# Patient Record
Sex: MALE | Race: BLACK OR AFRICAN AMERICAN | Employment: FULL TIME | URBAN - METROPOLITAN AREA
[De-identification: names, ages, dates, MRNs, and addresses within clinical notes are randomized per-mention and may not be internally consistent; named-entity substitution may affect disease eponyms.]

---

## 2017-02-08 ENCOUNTER — HOSPITAL ENCOUNTER (EMERGENCY)
Age: 32
Discharge: HOME | End: 2017-02-08
Attending: EMERGENCY MEDICINE
Payer: COMMERCIAL

## 2017-02-08 VITALS
OXYGEN SATURATION: 99 % | TEMPERATURE: 100.4 F | RESPIRATION RATE: 18 BRPM | WEIGHT: 190 LBS | SYSTOLIC BLOOD PRESSURE: 126 MMHG | BODY MASS INDEX: 25.73 KG/M2 | HEIGHT: 72 IN | DIASTOLIC BLOOD PRESSURE: 73 MMHG | HEART RATE: 89 BPM

## 2017-02-08 DIAGNOSIS — Z76.0 MEDICATION REFILL: ICD-10-CM

## 2017-02-08 DIAGNOSIS — J10.1 INFLUENZA A: Primary | ICD-10-CM

## 2017-02-08 LAB
FLUAV AG NPH QL IA: POSITIVE
FLUBV AG NPH QL IA: NEGATIVE
SERVICE CMNT-IMP: ABNORMAL

## 2017-02-08 PROCEDURE — 87804 INFLUENZA ASSAY W/OPTIC: CPT | Performed by: EMERGENCY MEDICINE

## 2017-02-08 PROCEDURE — 74011250637 HC RX REV CODE- 250/637: Performed by: EMERGENCY MEDICINE

## 2017-02-08 PROCEDURE — 99283 EMERGENCY DEPT VISIT LOW MDM: CPT | Performed by: EMERGENCY MEDICINE

## 2017-02-08 RX ORDER — ACETAMINOPHEN 500 MG
1000 TABLET ORAL
Status: COMPLETED | OUTPATIENT
Start: 2017-02-08 | End: 2017-02-08

## 2017-02-08 RX ORDER — OSELTAMIVIR PHOSPHATE 75 MG/1
75 CAPSULE ORAL 2 TIMES DAILY
Qty: 10 CAP | Refills: 0 | Status: SHIPPED | OUTPATIENT
Start: 2017-02-08 | End: 2017-02-13

## 2017-02-08 RX ORDER — ALBUTEROL SULFATE 90 UG/1
2 AEROSOL, METERED RESPIRATORY (INHALATION)
Qty: 1 INHALER | Refills: 0 | Status: SHIPPED | OUTPATIENT
Start: 2017-02-08

## 2017-02-08 RX ADMIN — ACETAMINOPHEN 1000 MG: 500 TABLET ORAL at 21:05

## 2017-02-08 NOTE — LETTER
[facility] EMERGENCY DEPT 
[address] [place] [id number] [phone] Work/School Note Date: 2/8/2017 To Whom It May concern: 
 
Sofiya Traore was seen and treated today in the emergency room by the following provider(s): 
Attending Provider: Henry Alford MD.   
 
Sofiya Traore may return to work on 2/11/2017.  
 
Sincerely, 
 
 
 
 
Elsi Martini RN

## 2017-02-08 NOTE — LETTER
[address] [facility] EMERGENCY DEPT 
[facility] [address] [facility] [address] 
[phone] Work/School Note Date: 2/8/2017 To Whom It May concern: 
 
Shady Mae was seen and treated today in the emergency room by the following provider(s): 
Attending Provider: Lacy Mcnamara MD.   
 
Shady Mae may return to work on 2/12/2017.  
 
Sincerely, 
 
 
 
 
Jake Carter RN

## 2017-02-09 NOTE — ED PROVIDER NOTES
Patient is a 27 y.o. male presenting with flu symptoms. The history is provided by the patient. Flu   This is a new problem. The current episode started yesterday. The problem occurs constantly. The problem has not changed since onset. The cough is non-productive. There has been a fever of 101 - 101.9 F. The fever has been present for less than 1 day. Associated symptoms include rhinorrhea, sore throat and myalgias. Pertinent negatives include no chest pain, no chills, no shortness of breath, no wheezing, no nausea and no vomiting. He has tried nothing for the symptoms. He is a smoker. His past medical history is significant for asthma. Past Medical History:   Diagnosis Date    Asthma        History reviewed. No pertinent past surgical history. History reviewed. No pertinent family history. Social History     Social History    Marital status: SINGLE     Spouse name: N/A    Number of children: N/A    Years of education: N/A     Occupational History    Not on file. Social History Main Topics    Smoking status: Current Every Day Smoker    Smokeless tobacco: Not on file    Alcohol use Yes    Drug use: No    Sexual activity: Not on file     Other Topics Concern    Not on file     Social History Narrative         ALLERGIES: Review of patient's allergies indicates no known allergies. Review of Systems   Constitutional: Positive for fever. Negative for chills. HENT: Positive for congestion, rhinorrhea and sore throat. Respiratory: Positive for cough. Negative for shortness of breath and wheezing. Cardiovascular: Negative for chest pain and leg swelling. Gastrointestinal: Negative for diarrhea, nausea and vomiting. Genitourinary: Negative for dysuria, frequency and hematuria. Musculoskeletal: Positive for myalgias. Negative for arthralgias.        Vitals:    02/08/17 2027   BP: 126/73   Pulse: 84   Resp: 18   Temp: (!) 101.5 °F (38.6 °C)   SpO2: 99%   Weight: 86.2 kg (190 lb) Height: 5' 11.5\" (1.816 m)            Physical Exam   Constitutional: He appears well-developed and well-nourished. HENT:   Right Ear: External ear normal.   Left Ear: External ear normal.   Oropharynx injected, uvula midline   Neck: Normal range of motion. Neck supple. Cardiovascular: Normal rate, regular rhythm and normal heart sounds. Pulmonary/Chest: Effort normal and breath sounds normal. No respiratory distress. He has no wheezes. He has no rales. Musculoskeletal: Normal range of motion. He exhibits no edema or tenderness. Lymphadenopathy:     He has no cervical adenopathy. Nursing note and vitals reviewed. MDM  Number of Diagnoses or Management Options  Diagnosis management comments: Inlfuenza A. No pneumonia clinically. No distress. Out of inhaler but no wheezing.        Amount and/or Complexity of Data Reviewed  Clinical lab tests: ordered and reviewed (Results for orders placed or performed during the hospital encounter of 02/08/17  -INFLUENZA A & B AG (RAPID TEST)       Result                                            Value                         Ref Range                       Influenza A Ag                                    POSITIVE (A)                  NEG                             Influenza B Ag                                    NEGATIVE                      NEG                             Comment                                           HIDE                                                     )      ED Course       Procedures

## 2017-02-09 NOTE — DISCHARGE INSTRUCTIONS
Influenza (Flu): Care Instructions  Your Care Instructions  Influenza (flu) is an infection in the lungs and breathing passages. It is caused by the influenza virus. There are different strains, or types, of the flu virus from year to year. Unlike the common cold, the flu comes on suddenly and the symptoms, such as a cough, congestion, fever, chills, fatigue, aches, and pains, are more severe. These symptoms may last up to 10 days. Although the flu can make you feel very sick, it usually doesn't cause serious health problems. Home treatment is usually all you need for flu symptoms. But your doctor may prescribe antiviral medicine to prevent other health problems, such as pneumonia, from developing. Older people and those who have a long-term health condition, such as lung disease, are most at risk for having pneumonia or other health problems. Follow-up care is a key part of your treatment and safety. Be sure to make and go to all appointments, and call your doctor if you are having problems. Its also a good idea to know your test results and keep a list of the medicines you take. How can you care for yourself at home? · Get plenty of rest.  · Drink plenty of fluids, enough so that your urine is light yellow or clear like water. If you have kidney, heart, or liver disease and have to limit fluids, talk with your doctor before you increase the amount of fluids you drink. · Take an over-the-counter pain medicine if needed, such as acetaminophen (Tylenol), ibuprofen (Advil, Motrin), or naproxen (Aleve), to relieve fever, headache, and muscle aches. Read and follow all instructions on the label. No one younger than 20 should take aspirin. It has been linked to Reye syndrome, a serious illness. · Do not smoke. Smoking can make the flu worse. If you need help quitting, talk to your doctor about stop-smoking programs and medicines. These can increase your chances of quitting for good.   · Breathe moist air from a hot shower or from a sink filled with hot water to help clear a stuffy nose. · Before you use cough and cold medicines, check the label. These medicines may not be safe for young children or for people with certain health problems. · If the skin around your nose and lips becomes sore, put some petroleum jelly on the area. · To ease coughing:  ¨ Drink fluids to soothe a scratchy throat. ¨ Suck on cough drops or plain hard candy. ¨ Take an over-the-counter cough medicine that contains dextromethorphan to help you get some sleep. Read and follow all instructions on the label. ¨ Raise your head at night with an extra pillow. This may help you rest if coughing keeps you awake. · Take any prescribed medicine exactly as directed. Call your doctor if you think you are having a problem with your medicine. To avoid spreading the flu  · Wash your hands regularly, and keep your hands away from your face. · Stay home from school, work, and other public places until you are feeling better and your fever has been gone for at least 24 hours. The fever needs to have gone away on its own without the help of medicine. · Ask people living with you to talk to their doctors about preventing the flu. They may get antiviral medicine to keep from getting the flu from you. · To prevent the flu in the future, get a flu vaccine every fall. Encourage people living with you to get the vaccine. · Cover your mouth when you cough or sneeze. When should you call for help? Call 911 anytime you think you may need emergency care. For example, call if:  · You have severe trouble breathing. Call your doctor now or seek immediate medical care if:  · You have new or worse trouble breathing. · You seem to be getting much sicker. · You feel very sleepy or confused. · You have a new or higher fever. · You get a new rash.   Watch closely for changes in your health, and be sure to contact your doctor if:  · You begin to get better and then get worse. · You are not getting better after 1 week. Where can you learn more? Go to http://siddhartha-geneva.info/. Enter Y757 in the search box to learn more about \"Influenza (Flu): Care Instructions. \"  Current as of: May 23, 2016  Content Version: 11.1  © 6496-7589 [employer]. Care instructions adapted under license by [employer] (which disclaims liability or warranty for this information). If you have questions about a medical condition or this instruction, always ask your healthcare professional. [address] any warranty or liability for your use of this information.

## 2022-08-11 ENCOUNTER — HOSPITAL ENCOUNTER (EMERGENCY)
Age: 37
Discharge: HOME | End: 2022-08-11
Attending: EMERGENCY MEDICINE | Admitting: EMERGENCY MEDICINE
Payer: COMMERCIAL

## 2022-08-11 VITALS
BODY MASS INDEX: 26.82 KG/M2 | DIASTOLIC BLOOD PRESSURE: 72 MMHG | RESPIRATION RATE: 16 BRPM | WEIGHT: 198 LBS | HEART RATE: 65 BPM | OXYGEN SATURATION: 99 % | SYSTOLIC BLOOD PRESSURE: 133 MMHG | TEMPERATURE: 98.2 F | HEIGHT: 72 IN

## 2022-08-11 DIAGNOSIS — L02.91 ABSCESS: Primary | ICD-10-CM

## 2022-08-11 PROCEDURE — 69000 DRG XTRNL EAR ABSC/HEM SMPL: CPT

## 2022-08-11 PROCEDURE — 99283 EMERGENCY DEPT VISIT LOW MDM: CPT

## 2022-08-11 PROCEDURE — 87070 CULTURE OTHR SPECIMN AEROBIC: CPT

## 2022-08-11 PROCEDURE — 6370000000 HC RX 637 (ALT 250 FOR IP): Performed by: PHYSICIAN ASSISTANT

## 2022-08-11 RX ORDER — IBUPROFEN 600 MG/1
600 TABLET ORAL
Status: COMPLETED | OUTPATIENT
Start: 2022-08-11 | End: 2022-08-11

## 2022-08-11 RX ADMIN — IBUPROFEN 600 MG: 600 TABLET ORAL at 16:49

## 2022-08-11 ASSESSMENT — PAIN DESCRIPTION - PAIN TYPE: TYPE: ACUTE PAIN

## 2022-08-11 ASSESSMENT — ENCOUNTER SYMPTOMS
NAUSEA: 0
VOMITING: 0
GASTROINTESTINAL NEGATIVE: 1
EYES NEGATIVE: 1
ALLERGIC/IMMUNOLOGIC NEGATIVE: 1
RESPIRATORY NEGATIVE: 1
COLOR CHANGE: 1

## 2022-08-11 ASSESSMENT — PAIN - FUNCTIONAL ASSESSMENT: PAIN_FUNCTIONAL_ASSESSMENT: 0-10

## 2022-08-11 ASSESSMENT — PAIN SCALES - GENERAL
PAINLEVEL_OUTOF10: 7
PAINLEVEL_OUTOF10: 7

## 2022-08-11 ASSESSMENT — PAIN DESCRIPTION - ORIENTATION
ORIENTATION: LEFT
ORIENTATION: LEFT

## 2022-08-11 ASSESSMENT — PAIN DESCRIPTION - LOCATION
LOCATION: FACE
LOCATION: FACE

## 2022-08-11 ASSESSMENT — PAIN DESCRIPTION - DESCRIPTORS
DESCRIPTORS: ACHING;THROBBING
DESCRIPTORS: TIGHTNESS

## 2022-08-11 ASSESSMENT — PAIN DESCRIPTION - FREQUENCY: FREQUENCY: CONTINUOUS

## 2022-08-11 NOTE — ED TRIAGE NOTES
Patient reports x months of raised red bump to left face. Patient states he has had increased swelling and pain x several days and was seen at urgent care x 2 days ago for same.  Masked

## 2022-08-11 NOTE — ED NOTES
I have reviewed discharge instructions with the patient. The patient verbalized understanding. Patient left ED via private vehicle. Discharge Method: ambulatory to Home with mother. Opportunity for questions and clarification provided. Patient given 0 scripts. To continue your aftercare when you leave the hospital, you may receive an automated call from our care team to check in on how you are doing. This is a free service and part of our promise to provide the best care and service to meet your aftercare needs.  If you have questions, or wish to unsubscribe from this service please call [phone]. Thank you for Choosing our [facility] Emergency Department.        Selena Singh RN  08/11/22 9613

## 2022-08-14 LAB
BACTERIA SPEC CULT: NORMAL
GRAM STN SPEC: NORMAL
GRAM STN SPEC: NORMAL
SERVICE CMNT-IMP: NORMAL

## 2022-08-15 ENCOUNTER — HOSPITAL ENCOUNTER (EMERGENCY)
Age: 37
Discharge: HOME | End: 2022-08-15
Attending: EMERGENCY MEDICINE
Payer: COMMERCIAL

## 2022-08-15 VITALS
WEIGHT: 198 LBS | HEIGHT: 72 IN | OXYGEN SATURATION: 98 % | DIASTOLIC BLOOD PRESSURE: 80 MMHG | SYSTOLIC BLOOD PRESSURE: 139 MMHG | HEART RATE: 75 BPM | RESPIRATION RATE: 17 BRPM | TEMPERATURE: 98.3 F | BODY MASS INDEX: 26.82 KG/M2

## 2022-08-15 DIAGNOSIS — Z51.89 WOUND CHECK, ABSCESS: Primary | ICD-10-CM

## 2022-08-15 PROCEDURE — 4500000002 HC ER NO CHARGE

## 2022-08-15 PROCEDURE — 6370000000 HC RX 637 (ALT 250 FOR IP): Performed by: EMERGENCY MEDICINE

## 2022-08-15 RX ORDER — IBUPROFEN 800 MG/1
800 TABLET ORAL EVERY 8 HOURS PRN
Qty: 21 TABLET | Refills: 0 | Status: SHIPPED | OUTPATIENT
Start: 2022-08-15

## 2022-08-15 RX ORDER — TRAMADOL HYDROCHLORIDE 50 MG/1
50-100 TABLET ORAL EVERY 8 HOURS PRN
Qty: 12 TABLET | Refills: 0 | Status: SHIPPED | OUTPATIENT
Start: 2022-08-15 | End: 2022-08-18

## 2022-08-15 RX ORDER — HYDROCODONE BITARTRATE AND ACETAMINOPHEN 10; 325 MG/1; MG/1
1 TABLET ORAL
Status: COMPLETED | OUTPATIENT
Start: 2022-08-15 | End: 2022-08-15

## 2022-08-15 RX ADMIN — HYDROCODONE BITARTRATE AND ACETAMINOPHEN 1 TABLET: 10; 325 TABLET ORAL at 16:17

## 2022-08-15 ASSESSMENT — PAIN - FUNCTIONAL ASSESSMENT
PAIN_FUNCTIONAL_ASSESSMENT: ACTIVITIES ARE NOT PREVENTED
PAIN_FUNCTIONAL_ASSESSMENT: 0-10

## 2022-08-15 ASSESSMENT — PAIN SCALES - GENERAL
PAINLEVEL_OUTOF10: 0
PAINLEVEL_OUTOF10: 10

## 2022-08-15 ASSESSMENT — ENCOUNTER SYMPTOMS
FACIAL SWELLING: 1
TROUBLE SWALLOWING: 0

## 2022-08-15 ASSESSMENT — PAIN DESCRIPTION - DESCRIPTORS: DESCRIPTORS: ACHING;DISCOMFORT

## 2022-08-15 ASSESSMENT — PAIN DESCRIPTION - LOCATION: LOCATION: FACE

## 2022-08-15 NOTE — ED PROVIDER NOTES
[facility] Emergency Department Provider Note                   PCP:                None Provider               Age: 39 y.o. Sex: male     No diagnosis found. DISPOSITION         New Prescriptions    No medications on file       No orders of the defined types were placed in this encounter. Noah Ramesh is a 39 y.o. male who presents to the Emergency Department with chief complaint of    Chief Complaint   Patient presents with    Skin Problem      Chief complaint : Wound check    HISTORY OF PRESENT ILLNESS :  Location : Left mandibular angle    Quality : Abscess    Quantity : Only 1    Timing : A week    Severity : Mild to moderate    Context : Status post I&D on August 11    Alleviating / exacerbating factors : Tender to palpation    Associated Symptoms : No fevers    -------------------------------    FAMILY HISTORY : Noncontributory    SURGICAL HISTORY : Recent incision and drainage     MEDICAL HISTORY : No diabetes    SOCIAL HISTORY : Consumes tobacco, consumes alcohol        Review of Systems   Constitutional:  Negative for chills and fever. HENT:  Positive for facial swelling. Negative for drooling and trouble swallowing. Musculoskeletal:  Negative for neck pain and neck stiffness. Skin:  Positive for wound. Negative for rash. All other systems reviewed and are negative. All other systems reviewed and are negative. Past Medical History:   Diagnosis Date    Asthma         History reviewed. No pertinent surgical history. History reviewed. No pertinent family history. Social Connections: Not on file        Allergies   Allergen Reactions    Shellfish Allergy      Seafood allergy        Vitals signs and nursing note reviewed. Patient Vitals for the past 4 hrs:   Temp Pulse Resp BP SpO2   08/15/22 1503 98.3 °F (36.8 °C) 75 17 139/80 98 %          Physical Exam  Vitals and nursing note reviewed. Constitutional:       General: He is not in acute distress. Appearance: Normal appearance. He is not ill-appearing. HENT:      Head: Normocephalic and atraumatic. Right Ear: External ear normal.      Left Ear: External ear normal.      Nose: Nose normal. No rhinorrhea. Eyes:      General: No scleral icterus. Right eye: No discharge. Left eye: No discharge. Extraocular Movements: Extraocular movements intact. Pulmonary:      Effort: Pulmonary effort is normal. No respiratory distress. Musculoskeletal:         General: No signs of injury. Normal range of motion. Cervical back: Normal range of motion and neck supple. Skin:     General: Skin is warm and dry. Coloration: Skin is not jaundiced or pale. Neurological:      General: No focal deficit present. Mental Status: He is alert and oriented to person, place, and time. Mental status is at baseline. Gait: Gait normal.   Psychiatric:         Mood and Affect: Mood normal.         Behavior: Behavior normal.        MDM  Number of Diagnoses or Management Options  Wound check, abscess: new, no workup  Diagnosis management comments: Wound check, repack abscess crater, remove in 2 days       Amount and/or Complexity of Data Reviewed  Decide to obtain previous medical records or to obtain history from someone other than the patient: yes    Risk of Complications, Morbidity, and/or Mortality  Presenting problems: low  Diagnostic procedures: minimal  Management options: low    Patient Progress  Patient progress: improved      Procedures    Labs Reviewed - No data to display     No orders to display            Michelle Coma Scale  Eye Opening: Spontaneous  Best Verbal Response: Oriented  Best Motor Response: Obeys commands  Michelle Coma Scale Score: 15                     Voice dictation software was used during the making of this note. This software is not perfect and grammatical and other typographical errors may be present.   This note has not been completely proofread for

## 2022-08-15 NOTE — ED TRIAGE NOTES
Pt had I&D of abscess 8/11 to L side of face in front of ear.  Pt returning for purulent drainage and removal of packing. (-)fevers  A&Ox4

## 2022-08-15 NOTE — ED NOTES
I have reviewed discharge instructions with the patient. The patient verbalized understanding. Patient left ED via Discharge Method: ambulatory to Home with friend. Opportunity for questions and clarification provided. Patient given 2 scripts. No e-sign. To continue your aftercare when you leave the hospital, you may receive an automated call from our care team to check in on how you are doing. This is a free service and part of our promise to provide the best care and service to meet your aftercare needs.  If you have questions, or wish to unsubscribe from this service please call [phone]. Thank you for Choosing our [facility] Emergency Department.          Lam Box RN  08/15/22 8908

## 2022-12-19 ENCOUNTER — HOSPITAL ENCOUNTER (EMERGENCY)
Dept: GENERAL RADIOLOGY | Age: 37
Discharge: HOME | End: 2022-12-22
Payer: COMMERCIAL

## 2022-12-19 ENCOUNTER — HOSPITAL ENCOUNTER (EMERGENCY)
Age: 37
Discharge: HOME | End: 2022-12-19
Attending: EMERGENCY MEDICINE
Payer: COMMERCIAL

## 2022-12-19 VITALS
SYSTOLIC BLOOD PRESSURE: 160 MMHG | RESPIRATION RATE: 18 BRPM | WEIGHT: 196 LBS | HEART RATE: 89 BPM | BODY MASS INDEX: 26.55 KG/M2 | DIASTOLIC BLOOD PRESSURE: 89 MMHG | TEMPERATURE: 97.8 F | HEIGHT: 72 IN | OXYGEN SATURATION: 98 %

## 2022-12-19 DIAGNOSIS — J45.20 MILD INTERMITTENT ASTHMA WITHOUT COMPLICATION: ICD-10-CM

## 2022-12-19 DIAGNOSIS — Z76.0 ENCOUNTER FOR MEDICATION REFILL: Primary | ICD-10-CM

## 2022-12-19 PROCEDURE — 71046 X-RAY EXAM CHEST 2 VIEWS: CPT

## 2022-12-19 PROCEDURE — 99283 EMERGENCY DEPT VISIT LOW MDM: CPT

## 2022-12-19 PROCEDURE — 6360000002 HC RX W HCPCS

## 2022-12-19 RX ORDER — DEXAMETHASONE 4 MG/1
6 TABLET ORAL
Status: COMPLETED | OUTPATIENT
Start: 2022-12-19 | End: 2022-12-19

## 2022-12-19 RX ORDER — ALBUTEROL SULFATE 90 UG/1
2 AEROSOL, METERED RESPIRATORY (INHALATION) 4 TIMES DAILY PRN
Qty: 18 G | Refills: 0 | Status: SHIPPED | OUTPATIENT
Start: 2022-12-19

## 2022-12-19 RX ADMIN — DEXAMETHASONE 6 MG: 4 TABLET ORAL at 11:14

## 2022-12-19 ASSESSMENT — LIFESTYLE VARIABLES
HOW OFTEN DO YOU HAVE A DRINK CONTAINING ALCOHOL: MONTHLY OR LESS
HOW MANY STANDARD DRINKS CONTAINING ALCOHOL DO YOU HAVE ON A TYPICAL DAY: 1 OR 2

## 2022-12-19 NOTE — Clinical Note
Lencho Vallejo was seen and treated in our emergency department on 12/19/2022. He may return to work on 12/19/2022. If you have any questions or concerns, please don't hesitate to call.       MATTHEW Jurado

## 2022-12-19 NOTE — DISCHARGE INSTRUCTIONS
You have been given a dose of oral steroids today in the emergency department. This will help open up your lungs and improve your asthma symptoms. Chest x-ray imaging was negative. I refilled your albuterol inhaler. See instructions below on how to establish with a primary care doctor. We would love to help you get a primary care doctor for follow-up after your emergency department visit. Please call [phone] between 7AM - 6PM Monday to Friday. A care navigator will be able to assist you with setting up a doctor close to your home.

## 2022-12-19 NOTE — Clinical Note
Erna Bauer was seen and treated in our emergency department on 12/19/2022. He may return to work on 12/19/2022. If you have any questions or concerns, please don't hesitate to call.       MATTHEW Lang

## 2022-12-19 NOTE — ED TRIAGE NOTES
Pt states some wheezing starting last night while at work. Pt reports albuterol inhaler Rx ran out. Some SOB with exertion reported.

## 2022-12-19 NOTE — ED PROVIDER NOTES
[facility] Emergency Department Provider Note                   PCP:                None Provider               Age: 44 y.o. Sex: male       ICD-10-CM    1. Encounter for medication refill  Z76.0       2. Mild intermittent asthma without complication  O93.24           DISPOSITION Decision To Discharge 12/19/2022 11:31:46 AM         Orders Placed This Encounter   Procedures    XR CHEST (2 VW)        Elli Pacheco is a 44 y.o. male who presents to the Emergency Department with chief complaint of    Chief Complaint   Patient presents with    Wheezing      26-year-old male with past medical history of asthma presenting to the emergency department with chief complaint of wheezing and that he ran out of his albuterol inhaler medication yesterday. The history is provided by the patient. Review of Systems    Past Medical History:   Diagnosis Date    Asthma         No past surgical history on file. No family history on file. Social History     Socioeconomic History    Marital status: Single   Tobacco Use    Smoking status: Every Day     Packs/day: 0.50     Types: Cigarettes    Smokeless tobacco: Never   Vaping Use    Vaping Use: Never used   Substance and Sexual Activity    Alcohol use: Yes    Drug use: Not Currently         Shellfish allergy     Discharge Medication List as of 12/19/2022 11:56 AM        CONTINUE these medications which have NOT CHANGED    Details   ibuprofen (IBU) 800 MG tablet Take 1 tablet by mouth every 8 hours as needed for Pain, Disp-21 tablet, R-0Print              Vitals signs and nursing note reviewed. No data found. Physical Exam  Vitals reviewed. Constitutional:       Appearance: Normal appearance. HENT:      Head: Normocephalic and atraumatic. Nose: No congestion or rhinorrhea. Mouth/Throat:      Mouth: Mucous membranes are moist.   Eyes:      Extraocular Movements: Extraocular movements intact.       Conjunctiva/sclera: Conjunctivae normal. Pupils: Pupils are equal, round, and reactive to light. Cardiovascular:      Rate and Rhythm: Normal rate and regular rhythm. Pulses: Normal pulses. Heart sounds: Normal heart sounds. No murmur heard. Pulmonary:      Effort: Pulmonary effort is normal. No respiratory distress. Breath sounds: Wheezing present. Comments: Auscultation of the lungs reveals wheezing bilaterally. Patient does is not in any respiratory distress on examination. Abdominal:      General: Abdomen is flat. There is no distension. Palpations: Abdomen is soft. Tenderness: There is no abdominal tenderness. There is no guarding. Genitourinary:     Penis: Normal.       Testes: Normal.   Musculoskeletal:         General: No swelling or tenderness. Normal range of motion. Cervical back: Normal range of motion and neck supple. No rigidity. Skin:     General: Skin is warm and dry. Neurological:      General: No focal deficit present. Mental Status: He is alert and oriented to person, place, and time. Psychiatric:         Mood and Affect: Mood normal.         Behavior: Behavior normal.         Thought Content: Thought content normal.         Judgment: Judgment normal.        MDM  Number of Diagnoses or Management Options  Encounter for medication refill  Mild intermittent asthma without complication  Diagnosis management comments: 44-year-old male here with complaint of medication refill. Mild intermittent asthma, medication refill    Chest x-ray obtained due to audible wheezing on physical exam.  Chest x-ray indicates no acute cardiopulmonary abnormality. Oral Decadron 6 mg given in ED course. Refilled patient's albuterol inhaler and given instructions to establish with PCP. Advised of return precautions. Procedures      Labs Reviewed - No data to display     XR CHEST (2 VW)   Final Result      No acute cardiopulmonary abnormality.                                 Voice dictation software was used during the making of this note. This software is not perfect and grammatical and other typographical errors may be present. This note has not been completely proofread for errors.      MATTHEW Earl  12/20/22 8937

## 2022-12-19 NOTE — ED NOTES
I have reviewed discharge instructions with the patient. The patient verbalized understanding. Patient left ED via Discharge Method: ambulatory to Home. Opportunity for questions and clarification provided. Patient given 1 scripts. To continue your aftercare when you leave the hospital, you may receive an automated call from our care team to check in on how you are doing. This is a free service and part of our promise to provide the best care and service to meet your aftercare needs.  If you have questions, or wish to unsubscribe from this service please call [phone]. Thank you for Choosing our [place] Emergency Department.         Lily Tee RN  12/19/22 5555

## 2022-12-19 NOTE — ED NOTES
I have reviewed discharge instructions with the patient. The patient verbalized understanding. Patient left ED via Discharge Method: ambulatory to Home with self. Opportunity for questions and clarification provided. Patient given 1 scripts. To continue your aftercare when you leave the hospital, you may receive an automated call from our care team to check in on how you are doing. This is a free service and part of our promise to provide the best care and service to meet your aftercare needs.  If you have questions, or wish to unsubscribe from this service please call [phone]. Thank you for Choosing our [facility] Emergency Department.         Owen Peres RN  12/19/22 8686

## 2023-02-01 ENCOUNTER — HOSPITAL ENCOUNTER (EMERGENCY)
Age: 38
Discharge: HOME | End: 2023-02-01
Attending: EMERGENCY MEDICINE
Payer: COMMERCIAL

## 2023-02-01 VITALS
RESPIRATION RATE: 20 BRPM | BODY MASS INDEX: 26.58 KG/M2 | WEIGHT: 196 LBS | HEART RATE: 74 BPM | OXYGEN SATURATION: 97 % | TEMPERATURE: 98.3 F | DIASTOLIC BLOOD PRESSURE: 90 MMHG | SYSTOLIC BLOOD PRESSURE: 149 MMHG

## 2023-02-01 DIAGNOSIS — J45.20 MILD INTERMITTENT ASTHMA WITHOUT COMPLICATION: Primary | ICD-10-CM

## 2023-02-01 PROBLEM — F10.10 ALCOHOL ABUSE, EPISODIC: Status: ACTIVE | Noted: 2019-02-22

## 2023-02-01 PROBLEM — L70.0 ACNE VULGARIS: Status: ACTIVE | Noted: 2018-09-11

## 2023-02-01 PROBLEM — L30.9 DERMATITIS: Status: ACTIVE | Noted: 2018-09-11

## 2023-02-01 PROCEDURE — 99283 EMERGENCY DEPT VISIT LOW MDM: CPT

## 2023-02-01 RX ORDER — ALBUTEROL SULFATE 90 UG/1
2 AEROSOL, METERED RESPIRATORY (INHALATION) 4 TIMES DAILY PRN
Qty: 4 EACH | Refills: 0 | Status: SHIPPED | OUTPATIENT
Start: 2023-02-01 | End: 2023-03-03

## 2023-02-01 ASSESSMENT — ENCOUNTER SYMPTOMS
WHEEZING: 1
COUGH: 1
SHORTNESS OF BREATH: 0
VOMITING: 0
ABDOMINAL PAIN: 0
SORE THROAT: 0
DIARRHEA: 0
NAUSEA: 0

## 2023-02-01 ASSESSMENT — PAIN - FUNCTIONAL ASSESSMENT: PAIN_FUNCTIONAL_ASSESSMENT: 0-10

## 2023-02-01 ASSESSMENT — PAIN SCALES - GENERAL: PAINLEVEL_OUTOF10: 7

## 2023-02-01 NOTE — ED NOTES
I have reviewed discharge instructions with the patient. The patient verbalized understanding. Patient left ED via Discharge Method: ambulatory to Home with self. Opportunity for questions and clarification provided. Patient given 1 scripts. To continue your aftercare when you leave the hospital, you may receive an automated call from our care team to check in on how you are doing. This is a free service and part of our promise to provide the best care and service to meet your aftercare needs.  If you have questions, or wish to unsubscribe from this service please call [phone]. Thank you for Choosing our [facility] Emergency Department.         Padma Pace RN  02/01/23 8504

## 2023-02-01 NOTE — ED TRIAGE NOTES
Pt reports hx of asthma that is flaring up x1 week. Py reports ome wheezes and cough .  Reports he is out of his inhaler

## 2023-02-01 NOTE — Clinical Note
Jacky Cha was seen and treated in our emergency department on 2/1/2023. He may return to work on 02/01/2023. If you have any questions or concerns, please don't hesitate to call.       MATTHEW Steward

## 2023-02-01 NOTE — DISCHARGE INSTRUCTIONS
Waited in the emergency department for feeling wheezy and needing albuterol inhaler    Physical exam is very reassuring  I have written you prescription for albuterol   you declined any steroids today      Contact [facility] to establish primary care    Return to the emergency Department for chest pain, shortness of breath, signs and symptoms of stroke as listed in your discharge paperwork

## 2023-12-27 NOTE — ED PROVIDER NOTES
Emergency Department Provider Note                   PCP:                None Provider               Age: 44 y.o. Sex: male       ICD-10-CM    1. Mild intermittent asthma without complication  K45.79 albuterol sulfate HFA (VENTOLIN HFA) 108 (90 Base) MCG/ACT inhaler          DISPOSITION Decision To Discharge 02/01/2023 10:00:25 AM       Medical Decision Making  Vital signs reviewed, patient stable, NAD, afebrile, nontoxic in appearance   No tachypnea   O2 saturation 97% on room air    On physical exam no wheezing rhonchi or rales noted. Breath sounds are present. Mucous membranes moist, posterior oropharynx is clear     I discussed with patient that we could give him a shot of Steroid here in the emergency department if he wanted and I could write him for short course of prednisone along with a refill of his albuterol inhaler. Patient states he does not want a steroid shot and does not want steroid prescription just wants albuterol inhaler prescription    Based on history, physical exam, I do not feel any lab work or any imaging is warranted at this time. Patient stable no urgent or emergent findings and can follow-up with primary care. History obtained from the patient  Reviewed previous ER visits from [facility] and other ERs  No indication for lab work today  No indication for imaging today  No indication for EKG    Patient discharged home in stable condition with prescription for albuterol. Return to ED precautions reiterated. Amount and/or Complexity of Data Reviewed  External Data Reviewed: notes. Risk  Prescription drug management. I have reviewed records from an external source: ED records from outside this hospital.  Considerations: Shared decision making was utilized in the care of this patient. Social determinant affecting care: not wanting care due to costs    We discussed care recommended by provider that patient declined (tests, disposition, etc).     No orders of the defined types were placed in this encounter.       Medications - No data to display    Discharge Medication List as of 2/1/2023 10:03 AM           Anthony Lynn is a 37 y.o. male who presents to the Emergency Department with chief complaint of  No chief complaint on file.     36 yo male with hx of mild intermittent asthma presents emergency department today with chief complaint of feeling kind of wheezy with some coughing for the past week.  Patient states he has not used an albuterol inhaler as he does not have 1.  Patient denies fevers, chills, shortness of breath, abdominal pain, nausea vomiting or diarrhea or myalgias.  Denies any nasal congestion.      The history is provided by the patient. No  was used.      Review of Systems   Constitutional:  Negative for chills and fever.   HENT:  Negative for congestion and sore throat.    Respiratory:  Positive for cough and wheezing. Negative for shortness of breath.    Cardiovascular:  Negative for chest pain.   Gastrointestinal:  Negative for abdominal pain, diarrhea, nausea and vomiting.   Musculoskeletal:  Negative for myalgias.   Neurological:  Negative for headaches.   All other systems reviewed and are negative.    Past Medical History:   Diagnosis Date    Asthma         History reviewed. No pertinent surgical history.     No family history on file.     Social History     Socioeconomic History    Marital status: Single     Spouse name: None    Number of children: None    Years of education: None    Highest education level: None   Tobacco Use    Smoking status: Every Day     Packs/day: 0.50     Types: Cigarettes    Smokeless tobacco: Never   Vaping Use    Vaping Use: Never used   Substance and Sexual Activity    Alcohol use: Yes    Drug use: Not Currently        Allergies: Shellfish allergy    Discharge Medication List as of 2/1/2023 10:03 AM        CONTINUE these medications which have NOT CHANGED    Details  ibuprofen (IBU) 800 MG tablet Take 1 tablet by mouth every 8 hours as needed for Pain, Disp-21 tablet, R-0Print              Vitals signs and nursing note reviewed. No data found. Physical Exam  Vitals and nursing note reviewed. Constitutional:       General: He is not in acute distress. Appearance: Normal appearance. He is normal weight. He is not ill-appearing, toxic-appearing or diaphoretic. HENT:      Head: Normocephalic and atraumatic. Nose: Nose normal.      Mouth/Throat:      Mouth: Mucous membranes are moist.      Pharynx: No oropharyngeal exudate or posterior oropharyngeal erythema. Eyes:      General: No scleral icterus. Extraocular Movements: Extraocular movements intact. Conjunctiva/sclera: Conjunctivae normal.   Cardiovascular:      Rate and Rhythm: Normal rate. Pulses: Normal pulses. Heart sounds: Normal heart sounds. Pulmonary:      Effort: Pulmonary effort is normal. No respiratory distress. Breath sounds: Normal breath sounds. No stridor. No wheezing, rhonchi or rales. Abdominal:      General: Bowel sounds are normal.      Palpations: Abdomen is soft. Tenderness: There is no abdominal tenderness. There is no guarding or rebound. Musculoskeletal:         General: Normal range of motion. Cervical back: Normal range of motion and neck supple. Lymphadenopathy:      Cervical: No cervical adenopathy. Skin:     General: Skin is warm and dry. Capillary Refill: Capillary refill takes less than 2 seconds. Neurological:      General: No focal deficit present. Mental Status: He is alert and oriented to person, place, and time. Psychiatric:         Mood and Affect: Mood normal.         Behavior: Behavior normal.         Thought Content: Thought content normal.         Judgment: Judgment normal.        Procedures      No results found for any visits on 02/01/23.      No orders to display                         Voice dictation software was used during the making of this note. This software is not perfect and grammatical and other typographical errors may be present. This note has not been completely proofread for errors.       Lazaro Mak, [id number]  02/01/23 [id number] Yes...